# Patient Record
Sex: MALE | Race: WHITE | NOT HISPANIC OR LATINO
[De-identification: names, ages, dates, MRNs, and addresses within clinical notes are randomized per-mention and may not be internally consistent; named-entity substitution may affect disease eponyms.]

---

## 2019-02-27 ENCOUNTER — RECORD ABSTRACTING (OUTPATIENT)
Age: 39
End: 2019-02-27

## 2019-02-27 DIAGNOSIS — Z82.49 FAMILY HISTORY OF ISCHEMIC HEART DISEASE AND OTHER DISEASES OF THE CIRCULATORY SYSTEM: ICD-10-CM

## 2019-02-27 DIAGNOSIS — E66.9 OBESITY, UNSPECIFIED: ICD-10-CM

## 2019-02-27 DIAGNOSIS — H35.61 RETINAL HEMORRHAGE, RIGHT EYE: ICD-10-CM

## 2019-02-27 DIAGNOSIS — H43.391 OTHER VITREOUS OPACITIES, RIGHT EYE: ICD-10-CM

## 2019-02-27 DIAGNOSIS — Z86.79 PERSONAL HISTORY OF OTHER DISEASES OF THE CIRCULATORY SYSTEM: ICD-10-CM

## 2019-02-27 DIAGNOSIS — Z84.81 FAMILY HISTORY OF CARRIER OF GENETIC DISEASE: ICD-10-CM

## 2019-02-27 DIAGNOSIS — Z86.39 PERSONAL HISTORY OF OTHER ENDOCRINE, NUTRITIONAL AND METABOLIC DISEASE: ICD-10-CM

## 2019-02-27 DIAGNOSIS — S05.8X1A OTHER INJURIES OF RIGHT EYE AND ORBIT, INITIAL ENCOUNTER: ICD-10-CM

## 2019-02-27 DIAGNOSIS — Z82.5 FAMILY HISTORY OF ASTHMA AND OTHER CHRONIC LOWER RESPIRATORY DISEASES: ICD-10-CM

## 2019-02-27 DIAGNOSIS — Z87.438 PERSONAL HISTORY OF OTHER DISEASES OF MALE GENITAL ORGANS: ICD-10-CM

## 2019-02-27 DIAGNOSIS — H25.13 AGE-RELATED NUCLEAR CATARACT, BILATERAL: ICD-10-CM

## 2019-02-27 RX ORDER — DIMETHYL FUMARATE 240 MG/1
240 CAPSULE ORAL TWICE DAILY
Refills: 0 | Status: ACTIVE | COMMUNITY

## 2019-02-28 ENCOUNTER — APPOINTMENT (OUTPATIENT)
Dept: INTERNAL MEDICINE | Facility: CLINIC | Age: 39
End: 2019-02-28
Payer: COMMERCIAL

## 2019-02-28 VITALS — DIASTOLIC BLOOD PRESSURE: 72 MMHG | SYSTOLIC BLOOD PRESSURE: 126 MMHG

## 2019-02-28 VITALS
HEART RATE: 74 BPM | HEIGHT: 70 IN | BODY MASS INDEX: 40.8 KG/M2 | OXYGEN SATURATION: 98 % | DIASTOLIC BLOOD PRESSURE: 76 MMHG | WEIGHT: 285 LBS | SYSTOLIC BLOOD PRESSURE: 150 MMHG

## 2019-02-28 DIAGNOSIS — G35 MULTIPLE SCLEROSIS: ICD-10-CM

## 2019-02-28 PROCEDURE — 99212 OFFICE O/P EST SF 10 MIN: CPT

## 2019-02-28 RX ORDER — FLUOROMETHOLONE 1 MG/G
0.1 OINTMENT OPHTHALMIC TWICE DAILY
Qty: 1 | Refills: 0 | Status: ACTIVE | COMMUNITY
Start: 2019-02-28 | End: 1900-01-01

## 2019-02-28 NOTE — HISTORY OF PRESENT ILLNESS
[de-identified] : notes eyes feel dry --eyelids swollen? no drainage or vision loss. Sees Dr Gould in [facility] (neuro)

## 2019-02-28 NOTE — ASSESSMENT
[FreeTextEntry1] : ms\par occular (eyelid) inflammation blepharitis\par \par fml to eyelids\par optho if not better

## 2019-02-28 NOTE — PHYSICAL EXAM
[Normal Sclera/Conjunctiva] : normal sclera/conjunctiva [PERRL] : pupils equal round and reactive to light [EOMI] : extraocular movements intact [No Respiratory Distress] : no respiratory distress  [Normal Rate] : normal rate  [de-identified] : mild nonspecific skin irritaiton of eyelids

## 2020-03-17 ENCOUNTER — TRANSCRIPTION ENCOUNTER (OUTPATIENT)
Age: 40
End: 2020-03-17

## 2020-11-28 ENCOUNTER — TRANSCRIPTION ENCOUNTER (OUTPATIENT)
Age: 40
End: 2020-11-28

## 2021-07-31 ENCOUNTER — TRANSCRIPTION ENCOUNTER (OUTPATIENT)
Age: 41
End: 2021-07-31

## 2021-09-24 ENCOUNTER — OUTPATIENT (OUTPATIENT)
Dept: OUTPATIENT SERVICES | Facility: HOSPITAL | Age: 41
LOS: 1 days | Discharge: HOME | End: 2021-09-24

## 2021-09-24 DIAGNOSIS — Z15.89 GENETIC SUSCEPTIBILITY TO OTHER DISEASE: ICD-10-CM

## 2021-09-28 ENCOUNTER — APPOINTMENT (OUTPATIENT)
Dept: HEMATOLOGY ONCOLOGY | Facility: CLINIC | Age: 41
End: 2021-09-28

## 2021-09-28 ENCOUNTER — LABORATORY RESULT (OUTPATIENT)
Age: 41
End: 2021-09-28

## 2021-10-11 ENCOUNTER — NON-APPOINTMENT (OUTPATIENT)
Age: 41
End: 2021-10-11

## 2021-11-12 NOTE — DISCUSSION/SUMMARY
[FreeTextEntry1] : REASON FOR CONSULT\par Joe Brower is a 41-year-old male who was called 10/11/2021 for a discussion regarding his negative negative genetic testing results related to hereditary cancer predisposition. \par \par Mr. Brower was originally seen at [facility] on 09/28/2021 for hereditary cancer predisposition risk assessment. He has no personal history of cancer, but his sister pursued genetic testing using [facility] which identified a pathogenic Ashkenazi Oriental orthodox  mutation in BRCA2 ([id number]) (07/16/2007). Mr. Brower decided to pursue BRCA1 and BRCA2 genetic testing using offered by [facility]. \par \par TEST RESULTS: NEGATIVE\par \par NO pathogenic (disease-causing) variants or VUSs were detected in the following genes:  BRCA1 and BRCA2\par \par Of note, the familial BRCA2 mutation was NOT detected in Mr. Brower’s sample. \par \par RESULTS INTERPRETATION AND ASSESSMENT:\par Given Mr. Brower’s personal and current reported family history of cancer, his negative genetic test results, and in the absence of other indications, he should practice age-appropriate cancer screening of other organ systems as recommended for the general population.\par \par We also discussed the limitations of negative results:\par 1.	Variants in other genes would not be identified by this analysis, so this negative result does not rule out the low likelihood of having a mutation in a different hereditary cancer gene or the possibility of ever developing cancer.\par \par We informed Mr. Brower that our knowledge of genetics and inherited cancer conditions is changing rapidly. Therefore, we recommended that Mr. Brower contact our office, every 2 to 3 years, to discuss relevant advances in cancer genetics.  We emphasized the importance of re-contacting us with updates regarding his personal and family history of cancer as well as any updates regarding additional cancer genetic test results performed for the patient and/or family members.  Such updates could possibly change our risk assessment and recommendations. \par \par In addition, we discussed Mr. Brower’s brother may consider pursuing cancer risk assessment genetic counseling with the option of genetic testing given the familial BRCA2 mutation. \par \par PLAN:\par 1.	These results do not change Mr. Brower’s medical management. \par 2.	A copy of genetic testing results and clinic note will be sent to Mr. Brower.\par 3.	Mr. Brower was encouraged to contact us every 2-3 years to discuss relevant advances in cancer genetics, or sooner if there are any changes in his personal or family history of cancer.\par \par \par For any additional questions please call [facility] at [phone]. \par \par \par Rupesh Zheng MS, [facility]\par Genetic Counselor, [facility]\par \par \par CC: \par Patient\par Dr. Celeste Cummings

## 2022-02-25 ENCOUNTER — TRANSCRIPTION ENCOUNTER (OUTPATIENT)
Age: 42
End: 2022-02-25

## 2022-12-16 ENCOUNTER — NON-APPOINTMENT (OUTPATIENT)
Age: 42
End: 2022-12-16

## 2022-12-21 ENCOUNTER — APPOINTMENT (OUTPATIENT)
Dept: OTOLARYNGOLOGY | Facility: CLINIC | Age: 42
End: 2022-12-21

## 2023-12-23 ENCOUNTER — NON-APPOINTMENT (OUTPATIENT)
Age: 43
End: 2023-12-23

## 2024-06-17 ENCOUNTER — APPOINTMENT (OUTPATIENT)
Dept: ORTHOPEDIC SURGERY | Facility: CLINIC | Age: 44
End: 2024-06-17

## 2024-06-17 VITALS — BODY MASS INDEX: 40.09 KG/M2 | HEIGHT: 70 IN | WEIGHT: 280 LBS

## 2024-06-17 DIAGNOSIS — S46.912A STRAIN OF UNSPECIFIED MUSCLE, FASCIA AND TENDON AT SHOULDER AND UPPER ARM LEVEL, LEFT ARM, INITIAL ENCOUNTER: ICD-10-CM

## 2024-06-17 DIAGNOSIS — M75.42 IMPINGEMENT SYNDROME OF LEFT SHOULDER: ICD-10-CM

## 2024-06-17 DIAGNOSIS — S16.1XXA STRAIN OF MUSCLE, FASCIA AND TENDON AT NECK LEVEL, INITIAL ENCOUNTER: ICD-10-CM

## 2024-06-17 PROCEDURE — 99204 OFFICE O/P NEW MOD 45 MIN: CPT

## 2024-06-17 PROCEDURE — 72040 X-RAY EXAM NECK SPINE 2-3 VW: CPT

## 2024-06-17 PROCEDURE — 73030 X-RAY EXAM OF SHOULDER: CPT | Mod: LT

## 2024-06-17 PROCEDURE — 73010 X-RAY EXAM OF SHOULDER BLADE: CPT | Mod: LT

## 2024-06-17 RX ORDER — METHYLPREDNISOLONE 4 MG/1
4 TABLET ORAL
Qty: 1 | Refills: 0 | Status: ACTIVE | COMMUNITY
Start: 2024-06-17 | End: 1900-01-01

## 2024-06-17 RX ORDER — NAPROXEN 500 MG/1
500 TABLET ORAL
Qty: 60 | Refills: 0 | Status: ACTIVE | COMMUNITY
Start: 2024-06-17 | End: 1900-01-01

## 2024-06-17 NOTE — PHYSICAL EXAM
[Left] : left shoulder [Moderate] : moderate [Mild] : mild [5 ___] : forward flexion 5[unfilled]/5 [5___] : external rotation 5[unfilled]/5 [] : no sensory deficits [Right] : right shoulder [Sitting] : sitting [FreeTextEntry8] : Tenderness along medial scapular boarder.  [de-identified] : [place]'s is equivocal.  [FreeTextEntry9] : 160/60/L3. [TWNoteComboBox4] : passive forward flexion 150 degrees [TWNoteComboBox6] : internal rotation L3 [de-identified] : external rotation 50 degrees

## 2024-06-17 NOTE — CONSULT LETTER
[Dear  ___] : Dear  [unfilled], [FreeTextEntry1] : Thank you for referring your patient for consultation.  Please see my note below.   If you have any questions, please do not hesitate to contact me.   Sincerely,   Rashaun Cid M.D. Shoulder Surgery

## 2024-06-17 NOTE — REASON FOR VISIT
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 Recently started having cervical and shoulder blade pain. No prior history/treatment. Reaching is painful. Night symptoms can occur. There is no n/t. NSAID use as needed with temporary relief.

## 2024-06-17 NOTE — IMAGING
[No bony abnormalities] : No bony abnormalities [Left] : left shoulder [FreeTextEntry1] : The joints are ok.  [FreeTextEntry5] : There is a type I acromion with a spur.

## 2024-06-17 NOTE — HISTORY OF PRESENT ILLNESS
[Dull/Aching] : dull/aching [Constant] : constant [Full time] : Work status: full time [de-identified] : 6 months injured in the GYM , left it to rest  [] : Post Surgical Visit: no [de-identified] :

## 2024-06-17 NOTE — ASSESSMENT
[FreeTextEntry1] : We discussed the underlying pathology.  Treatment options reviewed.  PT is planned. MDP is prescribed.  Naproxen is prescribed to be taken after MDP. If symptoms persist, an MRI is an option.  Cautions discussed.  Questions answered.  Patient seen by Rashaun Cid M.D. Entered by Yeni Hill acting as scribe.

## 2024-07-29 ENCOUNTER — APPOINTMENT (OUTPATIENT)
Dept: ORTHOPEDIC SURGERY | Facility: CLINIC | Age: 44
End: 2024-07-29
Payer: COMMERCIAL

## 2024-07-29 DIAGNOSIS — S16.1XXA STRAIN OF MUSCLE, FASCIA AND TENDON AT NECK LEVEL, INITIAL ENCOUNTER: ICD-10-CM

## 2024-07-29 DIAGNOSIS — S46.912A STRAIN OF UNSPECIFIED MUSCLE, FASCIA AND TENDON AT SHOULDER AND UPPER ARM LEVEL, LEFT ARM, INITIAL ENCOUNTER: ICD-10-CM

## 2024-07-29 DIAGNOSIS — M75.42 IMPINGEMENT SYNDROME OF LEFT SHOULDER: ICD-10-CM

## 2024-07-29 PROCEDURE — 99214 OFFICE O/P EST MOD 30 MIN: CPT

## 2024-07-29 RX ORDER — METHYLPREDNISOLONE 4 MG/1
4 TABLET ORAL
Qty: 1 | Refills: 0 | Status: ACTIVE | COMMUNITY
Start: 2024-07-29 | End: 1900-01-01

## 2024-07-29 NOTE — PHYSICAL EXAM
[Left] : left shoulder [Mild] : mild [5 ___] : forward flexion 5[unfilled]/5 [5___] : external rotation 5[unfilled]/5 [Right] : right shoulder [Sitting] : sitting [Standing] : standing [] : no sensory deficits [FreeTextEntry8] : Tenderness along medial scapular boarder.  [de-identified] : [place]'s is equivocal.  [de-identified] : Negative Phalen's [FreeTextEntry9] : 160/60/L3. [TWNoteComboBox4] : passive forward flexion 160 degrees [TWNoteComboBox6] : internal rotation L1 [de-identified] : external rotation 60 degrees

## 2024-07-29 NOTE — ASSESSMENT
[FreeTextEntry1] : We discussed his course.  MDP is prescribed.  PT is planned.  With MS and numbness, an MRI of the cervical spine is indicated. Questions answered.   Patient seen by Rashaun Cid M.D. Entered by Yeni Hill acting as scribe.

## 2024-07-29 NOTE — HISTORY OF PRESENT ILLNESS
[7] : 7 [Dull/Aching] : dull/aching [Radiating] : radiating [Shooting] : shooting [Stabbing] : stabbing [Tightness] : tightness [Tingling] : tingling [Intermittent] : intermittent [Full time] : Work status: full time [de-identified] : 07/29/2024: Patient reports that he is still having a lot of pain and discomfort. Patient went to one session of PT so far; he is scheduled for tomorrow 07/30/2024. Pain radiating down the arm to the fingers. Patient reports he is having numbness and tingling in the fingers and left shoulder blade. He notices pain in the neck as well.  [] : Post Surgical Visit: no [FreeTextEntry6] : Numbness  [FreeTextEntry7] : Down the arm to fingers [de-identified] :

## 2024-08-01 ENCOUNTER — RESULT REVIEW (OUTPATIENT)
Age: 44
End: 2024-08-01

## 2024-08-12 ENCOUNTER — APPOINTMENT (OUTPATIENT)
Dept: ORTHOPEDIC SURGERY | Facility: CLINIC | Age: 44
End: 2024-08-12
Payer: COMMERCIAL

## 2024-08-19 ENCOUNTER — APPOINTMENT (OUTPATIENT)
Dept: ORTHOPEDIC SURGERY | Facility: CLINIC | Age: 44
End: 2024-08-19
Payer: COMMERCIAL

## 2024-08-19 DIAGNOSIS — S46.912A STRAIN OF UNSPECIFIED MUSCLE, FASCIA AND TENDON AT SHOULDER AND UPPER ARM LEVEL, LEFT ARM, INITIAL ENCOUNTER: ICD-10-CM

## 2024-08-19 DIAGNOSIS — S16.1XXA STRAIN OF MUSCLE, FASCIA AND TENDON AT NECK LEVEL, INITIAL ENCOUNTER: ICD-10-CM

## 2024-08-19 PROCEDURE — 99214 OFFICE O/P EST MOD 30 MIN: CPT

## 2024-08-19 NOTE — REASON FOR VISIT
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 Recently started having cervical and shoulder blade pain. No prior history/treatment. Reaching is painful. Night symptoms can occur. There is no n/t. NSAID use as needed with temporary relief.   The medrol helped to a degree.  The Cerv MRI was done. He has been to 3 weeks of PT 2x day and is making progress.

## 2024-08-19 NOTE — ASSESSMENT
[FreeTextEntry1] : We discussed the Cerv MRI. Continue with PT. Pain management consultation is recommended. Questions answered.   Patient seen by Rashaun Cid M.D. Entered by Elana Cesar acting as scribe.

## 2024-08-19 NOTE — HISTORY OF PRESENT ILLNESS
[7] : 7 [Dull/Aching] : dull/aching [Radiating] : radiating [Shooting] : shooting [Stabbing] : stabbing [Tightness] : tightness [Tingling] : tingling [Intermittent] : intermittent [Full time] : Work status: full time [de-identified] : MRI REVIEW [facility] [] : Post Surgical Visit: no [FreeTextEntry6] : Numbness  [FreeTextEntry7] : Down the arm to fingers [de-identified] :

## 2024-08-19 NOTE — PHYSICAL EXAM
[Left] : left shoulder [Standing] : standing [Mild] : mild [5 ___] : forward flexion 5[unfilled]/5 [5___] : external rotation 5[unfilled]/5 [Right] : right shoulder [Sitting] : sitting [] : no sensory deficits [FreeTextEntry8] : Tenderness along medial scapular boarder.  [de-identified] : [place]'s is equivocal.  [de-identified] : Negative Phalen's [FreeTextEntry9] : 160/60/L3. [TWNoteComboBox4] : passive forward flexion 160 degrees [TWNoteComboBox6] : internal rotation L1 [de-identified] : external rotation 60 degrees

## 2024-08-26 ENCOUNTER — APPOINTMENT (OUTPATIENT)
Dept: PAIN MANAGEMENT | Facility: CLINIC | Age: 44
End: 2024-08-26
Payer: COMMERCIAL

## 2024-08-26 VITALS — WEIGHT: 282 LBS | HEIGHT: 70 IN | BODY MASS INDEX: 40.37 KG/M2

## 2024-08-26 DIAGNOSIS — M54.12 RADICULOPATHY, CERVICAL REGION: ICD-10-CM

## 2024-08-26 PROCEDURE — 99204 OFFICE O/P NEW MOD 45 MIN: CPT

## 2024-08-26 NOTE — PHYSICAL EXAM
[de-identified] : PHYSICAL EXAM  Constitutional:  Appears well, no apparent distress Ability to communicate: Normal Respiratory: non-labored breathing Skin: no rash noted Head: normocephalic, atraumatic Neck: no visible thyroid enlargement Eyes: extraocular movements intact Neurologic: alert and oriented x3 Psychiatric: normal mood, affect, and behavior  Cervical: Palpation: left trapezial spasm, left trapezius tenderness and left paracervical tenderness ROM: Diminished range of motion in all plains.  Patient notes pain at extremes of extension and rotation to left.  Stiffness at extremes of extension and rotation to the left MMT: Motor exam is 5/5 through out bilateral upper extremities. Sensation: Light touch and pain is intact throughout bilateral upper extremities. Reflexes: No sustained clonus. Special Testing: Positive left Spurling test   Assessemnt: Radiculopathy of cervical region (M54.12) Cervicalgia (M54.2)   Plan: After discussing various treatment options with the patient including but not limited to oral medications, physical therapy, exercise modalities as well as interventional spinal injections, we have decided with the following plan:  MRI Review  I personally reviewed the MRI/CT scan images and agree with the radiologist's report.  The radiological findings were discussed with the patient.     .  The risks, benefits and alternatives of the proposed procedure were explained in detail with the patient.  The risks outlined include but are not limited to infection, bleeding, post dural puncture headache, nerve injury, a temporary increase in pain, failure to resolve symptoms, allergic reaction, symptom recurrence, and possible elevation of blood sugar.  All questions were answered to patient's satisfaction and he/she verbalized an understanding.  Follow up 1-2 weeks post injection for re-evaluation.  Continue home exercises, stretching, activity modification, physical therapy, and conservative care.

## 2024-08-26 NOTE — HISTORY OF PRESENT ILLNESS
[Neck] : neck [Lower back] : lower back [Left Arm] : left arm [Left Leg] : left leg [Right Arm] : right arm [Sharp] : sharp [Shooting] : shooting [Throbbing] : throbbing [Tightness] : tightness [Tingling] : tingling [] : yes [Constant] : constant [Household chores] : household chores [Leisure] : leisure [Sleep] : sleep [Social interactions] : social interactions [Meds] : meds [Sitting] : sitting [Standing] : standing [Walking] : walking [Bending forward] : bending forward [Stairs] : stairs [Lying in bed] : lying in bed [FreeTextEntry7] : b/l arms  [FreeTextEntry9] : steroid

## 2024-08-26 NOTE — REASON FOR VISIT
[Initial Consultation] : an initial pain management consultation [FreeTextEntry2] : new consult for neck

## 2024-08-26 NOTE — DISCUSSION/SUMMARY
[de-identified] : I personally reviewed the MRI/CT scan images and agree with the radiologist's report. The radiological findings were discussed with the patient  Patient is presenting with acute/sub-acute radicular pain with impairment in ADLs and functionality.  The pain has not responded to  conservative care including nsaid therapy and/or physical therapy.  There is no bleeding tendency, unstable medical condition, or systemic infection. The proposed procedure corresponds clinically to the dermatomal pattern of the affected nerve/nerves.  proceed iwth louis [id number]

## 2024-09-06 ENCOUNTER — APPOINTMENT (OUTPATIENT)
Dept: PAIN MANAGEMENT | Facility: CLINIC | Age: 44
End: 2024-09-06

## 2024-09-13 ENCOUNTER — APPOINTMENT (OUTPATIENT)
Dept: PAIN MANAGEMENT | Facility: CLINIC | Age: 44
End: 2024-09-13

## 2024-09-23 ENCOUNTER — APPOINTMENT (OUTPATIENT)
Dept: PAIN MANAGEMENT | Facility: CLINIC | Age: 44
End: 2024-09-23